# Patient Record
Sex: FEMALE | Race: WHITE | URBAN - NONMETROPOLITAN AREA
[De-identification: names, ages, dates, MRNs, and addresses within clinical notes are randomized per-mention and may not be internally consistent; named-entity substitution may affect disease eponyms.]

---

## 2017-01-03 ENCOUNTER — NURSE ONLY (OUTPATIENT)
Dept: FAMILY MEDICINE CLINIC | Facility: CLINIC | Age: 57
End: 2017-01-03

## 2017-01-03 PROCEDURE — 90715 TDAP VACCINE 7 YRS/> IM: CPT | Performed by: FAMILY MEDICINE

## 2017-01-03 PROCEDURE — 90471 IMMUNIZATION ADMIN: CPT | Performed by: FAMILY MEDICINE

## 2017-03-06 ENCOUNTER — OFFICE VISIT (OUTPATIENT)
Dept: FAMILY MEDICINE CLINIC | Facility: CLINIC | Age: 57
End: 2017-03-06

## 2017-03-06 VITALS
TEMPERATURE: 98 F | DIASTOLIC BLOOD PRESSURE: 96 MMHG | HEART RATE: 76 BPM | WEIGHT: 149 LBS | SYSTOLIC BLOOD PRESSURE: 156 MMHG | OXYGEN SATURATION: 94 %

## 2017-03-06 DIAGNOSIS — J01.40 ACUTE NON-RECURRENT PANSINUSITIS: Primary | ICD-10-CM

## 2017-03-06 DIAGNOSIS — R03.0 ELEVATED BLOOD PRESSURE READING: ICD-10-CM

## 2017-03-06 PROCEDURE — 99213 OFFICE O/P EST LOW 20 MIN: CPT | Performed by: FAMILY MEDICINE

## 2017-03-06 RX ORDER — AMOXICILLIN AND CLAVULANATE POTASSIUM 875; 125 MG/1; MG/1
1 TABLET, FILM COATED ORAL 2 TIMES DAILY
Qty: 20 TABLET | Refills: 0 | Status: SHIPPED | OUTPATIENT
Start: 2017-03-06 | End: 2017-03-16

## 2017-03-06 NOTE — PROGRESS NOTES
HPI:   Leodan Iyer is a 64year old female who presents for upper respiratory symptoms for  3  weeks. Patient reports sore throat only at the beginning of sx's, green colored nasal discharge, cough with green colored sputum, sinus pain, denies fever. PLAN:   Cassi Cortez is a 64year old female who presents with Acute non-recurrent pansinusitis  (primary encounter diagnosis)  Elevated blood pressure reading  No orders of the defined types were placed in this encounter.      Meds & Refills for this Vi

## 2017-03-06 NOTE — PATIENT INSTRUCTIONS
Push fluids, add nasal saline 3-4 times daily, continue Flonase 2 sprays per nostril daily  Augmentin 875 mg twice daily ×10 days with food, side effects discussed  Discussed conservative management of hypertension including sodium restriction, daily aerob

## 2018-01-23 ENCOUNTER — TELEPHONE (OUTPATIENT)
Dept: FAMILY MEDICINE CLINIC | Facility: CLINIC | Age: 58
End: 2018-01-23

## 2018-01-23 NOTE — TELEPHONE ENCOUNTER
Pt c/o ear pain since last night. Offered appt @ 3pm today-----pt unable to make it.  Will go to [facility] for eval after she gets off work

## 2018-04-21 ENCOUNTER — TELEPHONE (OUTPATIENT)
Dept: FAMILY MEDICINE CLINIC | Facility: CLINIC | Age: 58
End: 2018-04-21

## 2018-12-06 ENCOUNTER — OFFICE VISIT (OUTPATIENT)
Dept: FAMILY MEDICINE CLINIC | Facility: CLINIC | Age: 58
End: 2018-12-06
Payer: COMMERCIAL

## 2018-12-06 VITALS
BODY MASS INDEX: 25.58 KG/M2 | HEIGHT: 63 IN | OXYGEN SATURATION: 98 % | HEART RATE: 68 BPM | TEMPERATURE: 99 F | DIASTOLIC BLOOD PRESSURE: 98 MMHG | WEIGHT: 144.38 LBS | SYSTOLIC BLOOD PRESSURE: 150 MMHG

## 2018-12-06 DIAGNOSIS — J04.10 TRACHEITIS: Primary | ICD-10-CM

## 2018-12-06 DIAGNOSIS — J20.9 BRONCHITIS WITH BRONCHOSPASM: ICD-10-CM

## 2018-12-06 DIAGNOSIS — R03.0 ELEVATED BLOOD PRESSURE READING: ICD-10-CM

## 2018-12-06 PROCEDURE — 99213 OFFICE O/P EST LOW 20 MIN: CPT | Performed by: FAMILY MEDICINE

## 2018-12-06 RX ORDER — BENZONATATE 200 MG/1
200 CAPSULE ORAL NIGHTLY
Qty: 10 CAPSULE | Refills: 0 | Status: SHIPPED | OUTPATIENT
Start: 2018-12-06

## 2018-12-06 RX ORDER — PREDNISONE 20 MG/1
20 TABLET ORAL 2 TIMES DAILY
Qty: 10 TABLET | Refills: 0 | Status: SHIPPED | OUTPATIENT
Start: 2018-12-06 | End: 2018-12-11

## 2018-12-06 RX ORDER — ALBUTEROL SULFATE 90 UG/1
2 AEROSOL, METERED RESPIRATORY (INHALATION) EVERY 4 HOURS PRN
Qty: 1 INHALER | Refills: 0 | Status: SHIPPED | OUTPATIENT
Start: 2018-12-06

## 2018-12-06 NOTE — PATIENT INSTRUCTIONS
I discussed the viral nature of the illness as well as the expected duration, course and progression of symptoms  I discussed the inappropriateness of antibiotics  Recommend albuterol 2 puffs up to every 4 hours as needed for cough, chest tightness, wheezi

## 2018-12-10 ENCOUNTER — TELEPHONE (OUTPATIENT)
Dept: FAMILY MEDICINE CLINIC | Facility: CLINIC | Age: 58
End: 2018-12-10

## 2018-12-10 RX ORDER — AZITHROMYCIN 250 MG/1
TABLET, FILM COATED ORAL
Qty: 1 PACKAGE | Refills: 0 | Status: SHIPPED | OUTPATIENT
Start: 2018-12-10

## 2018-12-10 NOTE — TELEPHONE ENCOUNTER
ELVIS ISN'T GETTING ANY BETTER FROM LAST WEEK WHEN SHE WAS IN. SHE WAS WONDERING IF SHE COULD GET AN ANTIBIOTIC TO HELP THINGS ALONG?   PLEASE CALL HER

## 2018-12-10 NOTE — TELEPHONE ENCOUNTER
Pt calls back. States still no fever and her chest congestion is finally \"breaking up\". Coughing a lot, productive ~50% of the time---thick and \"milky\" in color. Chest still feels heavy and has wheezing.  Using albuterol, taking prednisone and Tessalo

## 2018-12-10 NOTE — TELEPHONE ENCOUNTER
L/m to c/b    (pt seen on 12/6 for tracheitis and bronchitis w/ bronchospasm.  Was prescribed albuterol, prednisone and Tessalon)

## 2018-12-10 NOTE — TELEPHONE ENCOUNTER
I would continue another 3 days of prednisone 20 mg twice daily, continue all other medications  Start the Z-Phill

## 2019-03-15 ENCOUNTER — PATIENT OUTREACH (OUTPATIENT)
Dept: FAMILY MEDICINE CLINIC | Facility: CLINIC | Age: 59
End: 2019-03-15

## 2019-06-28 ENCOUNTER — OFFICE VISIT (OUTPATIENT)
Dept: OBGYN | Age: 59
End: 2019-06-28

## 2019-06-28 VITALS
BODY MASS INDEX: 23.05 KG/M2 | HEIGHT: 64 IN | SYSTOLIC BLOOD PRESSURE: 154 MMHG | DIASTOLIC BLOOD PRESSURE: 98 MMHG | WEIGHT: 135 LBS

## 2019-06-28 DIAGNOSIS — Z11.51 SCREENING FOR HUMAN PAPILLOMAVIRUS (HPV): ICD-10-CM

## 2019-06-28 DIAGNOSIS — I10 ESSENTIAL HYPERTENSION: ICD-10-CM

## 2019-06-28 DIAGNOSIS — Z01.419 ENCOUNTER FOR ROUTINE GYNECOLOGICAL EXAMINATION WITH PAPANICOLAOU SMEAR OF CERVIX: Primary | ICD-10-CM

## 2019-06-28 DIAGNOSIS — Z12.31 VISIT FOR SCREENING MAMMOGRAM: ICD-10-CM

## 2019-06-28 DIAGNOSIS — Z01.411 ENCOUNTER FOR GYNECOLOGICAL EXAMINATION WITH ABNORMAL FINDING: ICD-10-CM

## 2019-06-28 PROCEDURE — 88142 CYTOPATH C/V THIN LAYER: CPT | Performed by: PATHOLOGY

## 2019-06-28 PROCEDURE — 3080F DIAST BP >= 90 MM HG: CPT | Performed by: NURSE PRACTITIONER

## 2019-06-28 PROCEDURE — 3077F SYST BP >= 140 MM HG: CPT | Performed by: NURSE PRACTITIONER

## 2019-06-28 PROCEDURE — 99386 PREV VISIT NEW AGE 40-64: CPT | Performed by: NURSE PRACTITIONER

## 2019-06-28 RX ORDER — ATORVASTATIN CALCIUM 10 MG/1
10 TABLET, FILM COATED ORAL DAILY
COMMUNITY
Start: 2019-06-24

## 2019-07-03 LAB — AP REPORT: NORMAL

## 2019-07-05 ENCOUNTER — IMAGING SERVICES (OUTPATIENT)
Dept: MAMMOGRAPHY | Age: 59
End: 2019-07-05
Attending: NURSE PRACTITIONER

## 2019-07-05 DIAGNOSIS — Z12.31 VISIT FOR SCREENING MAMMOGRAM: ICD-10-CM

## 2019-07-05 PROCEDURE — 77067 SCR MAMMO BI INCL CAD: CPT | Performed by: RADIOLOGY

## 2019-07-05 PROCEDURE — 77063 BREAST TOMOSYNTHESIS BI: CPT | Performed by: RADIOLOGY

## 2019-07-09 LAB — HPV I/H RISK 4 DNA CVX QL NAA+PROBE: NORMAL

## 2019-08-01 ENCOUNTER — PATIENT OUTREACH (OUTPATIENT)
Dept: FAMILY MEDICINE CLINIC | Facility: CLINIC | Age: 59
End: 2019-08-01

## 2019-09-11 ENCOUNTER — PATIENT OUTREACH (OUTPATIENT)
Dept: FAMILY MEDICINE CLINIC | Facility: CLINIC | Age: 59
End: 2019-09-11

## 2019-10-07 ENCOUNTER — PATIENT OUTREACH (OUTPATIENT)
Dept: FAMILY MEDICINE CLINIC | Facility: CLINIC | Age: 59
End: 2019-10-07

## 2022-04-11 ENCOUNTER — OFFICE VISIT (OUTPATIENT)
Dept: FAMILY MEDICINE | Age: 62
End: 2022-04-11

## 2022-04-11 VITALS
HEIGHT: 63 IN | WEIGHT: 145 LBS | OXYGEN SATURATION: 97 % | DIASTOLIC BLOOD PRESSURE: 80 MMHG | SYSTOLIC BLOOD PRESSURE: 126 MMHG | BODY MASS INDEX: 25.69 KG/M2 | HEART RATE: 64 BPM

## 2022-04-11 DIAGNOSIS — Z12.31 VISIT FOR SCREENING MAMMOGRAM: ICD-10-CM

## 2022-04-11 DIAGNOSIS — Z01.419 ENCOUNTER FOR GYNECOLOGICAL EXAMINATION WITHOUT ABNORMAL FINDING: Primary | ICD-10-CM

## 2022-04-11 DIAGNOSIS — Z12.4 PAP SMEAR FOR CERVICAL CANCER SCREENING: ICD-10-CM

## 2022-04-11 DIAGNOSIS — Z11.51 SCREENING FOR HPV (HUMAN PAPILLOMAVIRUS): ICD-10-CM

## 2022-04-11 PROCEDURE — 87624 HPV HI-RISK TYP POOLED RSLT: CPT | Performed by: PHYSICIAN ASSISTANT

## 2022-04-11 PROCEDURE — PSEU9939 HPV, HIGH RISK: Performed by: CLINICAL MEDICAL LABORATORY

## 2022-04-11 PROCEDURE — 88142 CYTOPATH C/V THIN LAYER: CPT | Performed by: PATHOLOGY

## 2022-04-11 PROCEDURE — G0101 CA SCREEN;PELVIC/BREAST EXAM: HCPCS | Performed by: PHYSICIAN ASSISTANT

## 2022-04-11 PROCEDURE — 87624 HPV HI-RISK TYP POOLED RSLT: CPT | Performed by: CLINICAL MEDICAL LABORATORY

## 2022-04-11 ASSESSMENT — PATIENT HEALTH QUESTIONNAIRE - PHQ9
2. FEELING DOWN, DEPRESSED OR HOPELESS: NOT AT ALL
SUM OF ALL RESPONSES TO PHQ9 QUESTIONS 1 AND 2: 0
CLINICAL INTERPRETATION OF PHQ2 SCORE: NO FURTHER SCREENING NEEDED
SUM OF ALL RESPONSES TO PHQ9 QUESTIONS 1 AND 2: 0
1. LITTLE INTEREST OR PLEASURE IN DOING THINGS: NOT AT ALL

## 2022-04-15 ENCOUNTER — LAB REQUISITION (OUTPATIENT)
Dept: LAB | Age: 62
End: 2022-04-15

## 2022-04-15 DIAGNOSIS — Z11.51 ENCOUNTER FOR SCREENING FOR HUMAN PAPILLOMAVIRUS (HPV): ICD-10-CM

## 2022-04-15 DIAGNOSIS — Z12.4 ENCOUNTER FOR SCREENING FOR MALIGNANT NEOPLASM OF CERVIX: ICD-10-CM

## 2022-04-15 LAB — AP REPORT: NORMAL

## 2022-04-19 LAB
HPV16+18+45 E6+E7MRNA CVX NAA+PROBE: NEGATIVE
HPV16+18+45 E6+E7MRNA CVX NAA+PROBE: NEGATIVE
Lab: NORMAL
Lab: NORMAL

## 2023-04-26 ENCOUNTER — OFFICE VISIT (OUTPATIENT)
Dept: FAMILY MEDICINE CLINIC | Facility: CLINIC | Age: 63
End: 2023-04-26
Payer: COMMERCIAL

## 2023-04-26 VITALS
RESPIRATION RATE: 18 BRPM | HEART RATE: 75 BPM | TEMPERATURE: 98 F | DIASTOLIC BLOOD PRESSURE: 74 MMHG | OXYGEN SATURATION: 95 % | SYSTOLIC BLOOD PRESSURE: 126 MMHG | BODY MASS INDEX: 27 KG/M2 | WEIGHT: 152 LBS

## 2023-04-26 DIAGNOSIS — J02.9 SORE THROAT: ICD-10-CM

## 2023-04-26 DIAGNOSIS — J20.8 ACUTE VIRAL BRONCHITIS: Primary | ICD-10-CM

## 2023-04-26 LAB
CONTROL LINE PRESENT WITH A CLEAR BACKGROUND (YES/NO): YES YES/NO
KIT LOT #: NORMAL NUMERIC
POCT LOT NUMBER: NORMAL
RAPID SARS-COV-2 BY PCR: NOT DETECTED
STREP GRP A CUL-SCR: NEGATIVE

## 2023-04-26 PROCEDURE — U0002 COVID-19 LAB TEST NON-CDC: HCPCS | Performed by: PHYSICIAN ASSISTANT

## 2023-04-26 PROCEDURE — 3074F SYST BP LT 130 MM HG: CPT | Performed by: PHYSICIAN ASSISTANT

## 2023-04-26 PROCEDURE — 3078F DIAST BP <80 MM HG: CPT | Performed by: PHYSICIAN ASSISTANT

## 2023-04-26 PROCEDURE — 87880 STREP A ASSAY W/OPTIC: CPT | Performed by: PHYSICIAN ASSISTANT

## 2023-04-26 PROCEDURE — 99203 OFFICE O/P NEW LOW 30 MIN: CPT | Performed by: PHYSICIAN ASSISTANT

## 2023-04-26 RX ORDER — ALBUTEROL SULFATE 90 UG/1
2 AEROSOL, METERED RESPIRATORY (INHALATION) EVERY 4 HOURS PRN
Qty: 1 EACH | Refills: 0 | Status: SHIPPED | OUTPATIENT
Start: 2023-04-26

## 2023-04-26 RX ORDER — BENZONATATE 200 MG/1
200 CAPSULE ORAL 3 TIMES DAILY PRN
Qty: 30 CAPSULE | Refills: 0 | Status: SHIPPED | OUTPATIENT
Start: 2023-04-26

## 2023-04-26 NOTE — PATIENT INSTRUCTIONS
Rapid strep and rapid COVID-19 PCR negative. Tessalon (benzonatate) 200 mg up to three times daily as needed for cough. Albuterol inhaler 2 puffs inhaled every 4 to 6 hours as needed for wheezing or shortness of breath.    Encourage fluids, humidifier/vaporizor at bedside, elevate head of bed (sleep with extra pillow), vapor rub to chest, steam therapy if no fever, warm compresses for sinus pressure if no fever, salt water gargles for sore throat, lozenges for sore throat, may try over the counter saline nasal spray or irrigation kit (use distilled water with irrigation kit) for sinus pressure/congestion, get plenty of rest.

## 2023-04-29 ENCOUNTER — HOSPITAL ENCOUNTER (OUTPATIENT)
Age: 63
Discharge: HOME | End: 2023-04-29
Payer: COMMERCIAL

## 2023-04-29 ENCOUNTER — APPOINTMENT (OUTPATIENT)
Dept: GENERAL RADIOLOGY | Age: 63
End: 2023-04-29
Attending: NURSE PRACTITIONER
Payer: COMMERCIAL

## 2023-04-29 ENCOUNTER — OFFICE VISIT (OUTPATIENT)
Dept: FAMILY MEDICINE CLINIC | Facility: CLINIC | Age: 63
End: 2023-04-29
Payer: COMMERCIAL

## 2023-04-29 VITALS
HEART RATE: 86 BPM | TEMPERATURE: 98 F | DIASTOLIC BLOOD PRESSURE: 86 MMHG | OXYGEN SATURATION: 98 % | RESPIRATION RATE: 20 BRPM | SYSTOLIC BLOOD PRESSURE: 150 MMHG

## 2023-04-29 VITALS
OXYGEN SATURATION: 95 % | HEART RATE: 98 BPM | BODY MASS INDEX: 26 KG/M2 | SYSTOLIC BLOOD PRESSURE: 130 MMHG | WEIGHT: 149 LBS | DIASTOLIC BLOOD PRESSURE: 82 MMHG | TEMPERATURE: 97 F | RESPIRATION RATE: 20 BRPM

## 2023-04-29 DIAGNOSIS — R05.1 ACUTE COUGH: Primary | ICD-10-CM

## 2023-04-29 DIAGNOSIS — J20.8 VIRAL BRONCHITIS: ICD-10-CM

## 2023-04-29 DIAGNOSIS — R06.02 SHORTNESS OF BREATH: ICD-10-CM

## 2023-04-29 DIAGNOSIS — R06.2 WHEEZING: ICD-10-CM

## 2023-04-29 LAB — SARS-COV-2 RNA RESP QL NAA+PROBE: NOT DETECTED

## 2023-04-29 PROCEDURE — U0002 COVID-19 LAB TEST NON-CDC: HCPCS | Performed by: NURSE PRACTITIONER

## 2023-04-29 PROCEDURE — 99213 OFFICE O/P EST LOW 20 MIN: CPT | Performed by: NURSE PRACTITIONER

## 2023-04-29 PROCEDURE — 71046 X-RAY EXAM CHEST 2 VIEWS: CPT | Performed by: NURSE PRACTITIONER

## 2023-04-29 PROCEDURE — 94640 AIRWAY INHALATION TREATMENT: CPT | Performed by: NURSE PRACTITIONER

## 2023-04-29 PROCEDURE — 3079F DIAST BP 80-89 MM HG: CPT | Performed by: NURSE PRACTITIONER

## 2023-04-29 PROCEDURE — 3075F SYST BP GE 130 - 139MM HG: CPT | Performed by: NURSE PRACTITIONER

## 2023-04-29 RX ORDER — CODEINE PHOSPHATE AND GUAIFENESIN 10; 100 MG/5ML; MG/5ML
10 SOLUTION ORAL EVERY 6 HOURS PRN
Qty: 180 ML | Refills: 0 | Status: SHIPPED | OUTPATIENT
Start: 2023-04-29

## 2023-04-29 RX ORDER — PREDNISONE 20 MG/1
20 TABLET ORAL 2 TIMES DAILY
Qty: 10 TABLET | Refills: 0 | COMMUNITY
Start: 2023-04-27 | End: 2023-05-02

## 2023-04-29 RX ORDER — IPRATROPIUM BROMIDE AND ALBUTEROL SULFATE 2.5; .5 MG/3ML; MG/3ML
3 SOLUTION RESPIRATORY (INHALATION) ONCE
Status: COMPLETED | OUTPATIENT
Start: 2023-04-29 | End: 2023-04-29

## 2023-04-29 NOTE — PROGRESS NOTES
Fanny Petty is a 58year old female who presents to [facility] with c/o worsening cough, wheezing this week. Seen her on 4/26 and had negative strep and covid testing. Placed on albuterol and benzonatate. Did virtual visit with pcp on 4/27 and started on prednisone. Notes worsening cough, with wheezing and low grade temps to 100. On exam pt has coarse lungs bilat. Faint expiratory wheeze. Nonproductive cough noted. O2 sats 95-96%    Accompanied by: self  After triage, higher acuity of care was recommended to Fanny Petty today. I called the [facility] and spoke to the provider. She ok'd transfer. Discussed HPI and physical exam with pt. We discussed the limited diagnostic capacity of this clinic and there are dangerous conditions associated with her worsening cough, wheezing and low grade fevers that I can not fully rule out. I advised she be seen in the IC. Pt verbalized understanding and notes she will go to the [facility]. She declined medical transport. We discussed the risks of not going by medical transport including worsening condition, permanent injury and/or death. She verbalized understanding an signed refusal.    Site recommendation: :Sandeephelen Kizzy verbalized understanding of rationale for further evaluation and was stable upon discharge.

## 2023-04-29 NOTE — ED INITIAL ASSESSMENT (HPI)
Patient has been coughing since Monday and is getting worse. She had a negative covid on Wednesday. She went to the [facility] today and was sent here for an xray.  She has expiratory wheezing to all lobes currently

## 2024-02-08 ENCOUNTER — APPOINTMENT (OUTPATIENT)
Dept: PULMONOLOGY | Age: 64
End: 2024-02-08

## 2024-02-29 ENCOUNTER — APPOINTMENT (OUTPATIENT)
Dept: PULMONOLOGY | Age: 64
End: 2024-02-29

## 2024-05-29 ENCOUNTER — APPOINTMENT (OUTPATIENT)
Dept: PULMONOLOGY | Age: 64
End: 2024-05-29

## 2024-08-25 NOTE — PROGRESS NOTES
Please call patient to schedule physical/Medicare AWV.  Overdue for colon cancer screening (and, likely, other services as well). [phone] (home) Left message for patient to call back.
No

## 2024-12-17 ENCOUNTER — OFFICE VISIT (OUTPATIENT)
Dept: FAMILY MEDICINE CLINIC | Facility: CLINIC | Age: 64
End: 2024-12-17
Payer: COMMERCIAL

## 2024-12-17 VITALS
SYSTOLIC BLOOD PRESSURE: 135 MMHG | DIASTOLIC BLOOD PRESSURE: 79 MMHG | TEMPERATURE: 98 F | RESPIRATION RATE: 18 BRPM | OXYGEN SATURATION: 98 % | HEART RATE: 78 BPM

## 2024-12-17 DIAGNOSIS — J02.9 SORE THROAT: Primary | ICD-10-CM

## 2024-12-17 DIAGNOSIS — J06.9 VIRAL URI: ICD-10-CM

## 2024-12-17 LAB
CONTROL LINE PRESENT WITH A CLEAR BACKGROUND (YES/NO): YES YES/NO
KIT LOT #: NORMAL NUMERIC
STREP GRP A CUL-SCR: NEGATIVE

## 2024-12-17 PROCEDURE — 3075F SYST BP GE 130 - 139MM HG: CPT | Performed by: NURSE PRACTITIONER

## 2024-12-17 PROCEDURE — 87880 STREP A ASSAY W/OPTIC: CPT | Performed by: NURSE PRACTITIONER

## 2024-12-17 PROCEDURE — 3078F DIAST BP <80 MM HG: CPT | Performed by: NURSE PRACTITIONER

## 2024-12-17 PROCEDURE — 87637 SARSCOV2&INF A&B&RSV AMP PRB: CPT | Performed by: NURSE PRACTITIONER

## 2024-12-17 PROCEDURE — 99213 OFFICE O/P EST LOW 20 MIN: CPT | Performed by: NURSE PRACTITIONER

## 2024-12-17 RX ORDER — ATORVASTATIN CALCIUM 10 MG/1
10 TABLET, FILM COATED ORAL DAILY
COMMUNITY
Start: 2024-11-30

## 2024-12-17 NOTE — PROGRESS NOTES
CHIEF COMPLAINT:     Chief Complaint   Patient presents with    Sinus Problem     Fevers, ear pressure, sore throat, sinus congestion       HPI:   Maria D Jaocbs is a 64 year old female who presents for sore throat, sinus pressure, ear pressure, tactile fevers and bodya ches. Patient reports symptoms present x 2-3 days.  Denies any wheezing, chest discomfort, or shortness of breath. .  Treating symptoms with otc cold meds.   Tolerates PO well at home. No n/v/d.  Denies any other aggravating or relieving factors at home. Denies any other treatment attempts prior to arrival.   Denies known close covid-19 or strep exposure.    Current Outpatient Medications   Medication Sig Dispense Refill    atorvastatin 10 MG Oral Tab Take 1 tablet (10 mg total) by mouth daily.      albuterol 108 (90 Base) MCG/ACT Inhalation Aero Soln Inhale 2 puffs into the lungs every 4 (four) hours as needed for Wheezing or Shortness of Breath. 1 each 0      Past Medical History:    Frozen shoulder      Past Surgical History:   Procedure Laterality Date          Other surgical history Right     breast biopsy-benign    Tonsillectomy           Social History     Socioeconomic History    Marital status:    Tobacco Use    Smoking status: Never    Smokeless tobacco: Never   Substance and Sexual Activity    Alcohol use: No    Drug use: No     Social Drivers of Health     Food Insecurity: Low Risk  (10/26/2022)    Received from [facility]    Food Insecurity     Have there been times that your food ran out, and you didn't have money to get more?: No     Are there times that you worry that this might happen?: No   Transportation Needs: Low Risk  (10/26/2022)    Received from [facility]    Transportation Needs     Do you have trouble getting transportation to medical appointments?: No    [facility]    Social Connections    Received  from [facility], [facility]    Housing Stability         REVIEW OF SYSTEMS:   GENERAL: Denies fever today. + tactile fever on Sunday. Notes good appetite  SKIN: no rashes or abnormal skin lesions  HEENT: + sore throat, + sinus symptoms, + bilat ear pressure. Denies ear pain/drainage  LUNGS: + occasional nonproductive cough, denies shortness of breath or wheezing,   CARDIOVASCULAR: denies chest pain or palpitations   GI: denies N/V/C or abdominal pain  NEURO: Denies headaches    EXAM:   /79   Pulse 78   Temp 98.2 °F (36.8 °C) (Temporal)   Resp 18   SpO2 98%   GENERAL: well developed, well nourished,in no apparent distress  SKIN: no rashes,no suspicious lesions  HEAD: atraumatic, normocephalic.    EYES: conjunctiva clear  EARS: TM's intact and without erythema, no bulging, no retraction,no fluid, bony landmarks visualized. No erythema or swelling noted to ear canals or external ears.   NOSE: Nostrils patent, clear nasal discharge, nasal mucosa reddened   THROAT: Oral mucosa pink, moist. Posterior pharynx is erythematous. No exudates. No tonsillar hypertrophy noted.  No trismus. Uvula midline with no swelling. Voice clear/normal. No stridor  NECK: Supple, non-tender  LUNGS: clear to auscultation bilaterally, no rales, wheezes or rhonchi. Breathing is non labored.  CARDIO: RRR without murmur  EXTREMITIES: no cyanosis, clubbing or edema  LYMPH:  No lymphadenopathy.        ASSESSMENT AND PLAN:       ICD-10-CM    1. Sore throat  J02.9 Rapid Strep      2. Viral URI  J06.9 SARS-CoV-2/Flu A and B/RSV by PCR ([facility]) [E]     SARS-CoV-2/Flu A and B/RSV by PCR ([facility]) [E]        Viral panelt testing sent to lab.    Rapid strep negative.     Discussed physical exam and hpi with pt.  Pt has reassuring physical exam consistent with pharyngitis and mild viral uri symptoms.  No signs of pta or retropharyngeal infection.Lungs clear bilat. No respiratory distress noted. We discussed  covid-19 vs strep vs other etiologies. Rapid strep test negative. Viral panel test sent to lab.  Treatment options discussed with patient and explained in detail. We reviewed symptomatic care at home. The risks, benefits and potential side effects of possible medications were reviewed. Alternatives were discussed. Monitoring parameters and expected course outlined. We reviewed self quarantine guidelines. Patient to call PCP or go to emergency department if symptoms fail to respond as outlined, or worsen in any way. The patient agreed with the plan.      Patient Instructions   1. Rest. Drink plenty of fluids.  2. Tylenol/Ibuprofen for pain/fevers.  3. Salt water gargles three times daily  4. Use humidifier at home when possible.  5. The rapid strep test was negative today.   6. Covid-19/FLU/RSV testing sent to lab.  Self quarantine at this time. If covid-19 test is positive then please follow the listed guidelines below:  Home isolation until:  Your symptoms are improving AND  You are fever free for 24 hours without using fever reducing medications  Resume normal activities, and use added prevention strategies over the next five days.  Clean your hands often  wear a well-fitting mask when around others  keep a distance from others    7. Follow up with PMD in 4-5 days for re-eval. Go to the emergency department immediately if symptoms worsen, change, you develop chest discomfort, wheezing, shortness of breath, or if you have any concerns.

## 2024-12-17 NOTE — PATIENT INSTRUCTIONS
1. Rest. Drink plenty of fluids.  2. Tylenol/Ibuprofen for pain/fevers.  3. Salt water gargles three times daily  4. Use humidifier at home when possible.  5. The rapid strep test was negative today.   6. Covid-19/FLU/RSV testing sent to lab.  Self quarantine at this time. If covid-19 test is positive then please follow the listed guidelines below:  Home isolation until:  Your symptoms are improving AND  You are fever free for 24 hours without using fever reducing medications  Resume normal activities, and use added prevention strategies over the next five days.  Clean your hands often  wear a well-fitting mask when around others  keep a distance from others    7. Follow up with PMD in 4-5 days for re-eval. Go to the emergency department immediately if symptoms worsen, change, you develop chest discomfort, wheezing, shortness of breath, or if you have any concerns.

## 2024-12-18 LAB
FLUAV + FLUBV RNA SPEC NAA+PROBE: NOT DETECTED
FLUAV + FLUBV RNA SPEC NAA+PROBE: NOT DETECTED
RSV RNA SPEC NAA+PROBE: NOT DETECTED
SARS-COV-2 RNA RESP QL NAA+PROBE: DETECTED

## 2025-01-01 ASSESSMENT — ENCOUNTER SYMPTOMS
POLYPHAGIA: 0
SPEECH DIFFICULTY: 0
ACTIVITY CHANGE: 0
UNEXPECTED WEIGHT CHANGE: 0
NAUSEA: 0
EYE REDNESS: 0
FATIGUE: 0
VOICE CHANGE: 0
TROUBLE SWALLOWING: 0
ABDOMINAL PAIN: 0
RHINORRHEA: 0
HEADACHES: 0
FEVER: 0
EYE PAIN: 0
GASTROINTESTINAL NEGATIVE: 1
VOMITING: 0
CONFUSION: 0
BACK PAIN: 0
SLEEP DISTURBANCE: 0
DIARRHEA: 0
COUGH: 0
POLYDIPSIA: 0
DIZZINESS: 0
NEUROLOGICAL NEGATIVE: 1
CONSTITUTIONAL NEGATIVE: 1
LIGHT-HEADEDNESS: 0
CONSTIPATION: 0
CHEST TIGHTNESS: 0
APPETITE CHANGE: 0
RESPIRATORY NEGATIVE: 1
PHOTOPHOBIA: 0
NERVOUS/ANXIOUS: 0
SHORTNESS OF BREATH: 0
PSYCHIATRIC NEGATIVE: 1
WEAKNESS: 0

## 2025-01-02 ENCOUNTER — APPOINTMENT (OUTPATIENT)
Dept: FAMILY MEDICINE | Age: 65
End: 2025-01-02

## 2025-02-10 ENCOUNTER — APPOINTMENT (OUTPATIENT)
Dept: OTOLARYNGOLOGY | Age: 65
End: 2025-02-10

## 2025-02-10 VITALS — BODY MASS INDEX: 24.24 KG/M2 | HEIGHT: 64 IN | WEIGHT: 142 LBS

## 2025-02-10 DIAGNOSIS — R09.A2 GLOBUS SENSATION: ICD-10-CM

## 2025-02-10 DIAGNOSIS — K21.9 LARYNGOPHARYNGEAL REFLUX (LPR): ICD-10-CM

## 2025-02-10 DIAGNOSIS — R09.89 THROAT CLEARING: Primary | ICD-10-CM

## 2025-02-10 DIAGNOSIS — J31.0 RHINITIS, UNSPECIFIED TYPE: ICD-10-CM

## 2025-02-10 DIAGNOSIS — R09.82 PND (POST-NASAL DRIP): ICD-10-CM

## 2025-02-10 PROCEDURE — 99204 OFFICE O/P NEW MOD 45 MIN: CPT | Performed by: OTOLARYNGOLOGY

## 2025-02-10 PROCEDURE — 31575 DIAGNOSTIC LARYNGOSCOPY: CPT | Performed by: OTOLARYNGOLOGY

## 2025-02-10 RX ORDER — OMEPRAZOLE 40 MG/1
40 CAPSULE, DELAYED RELEASE ORAL
Qty: 90 CAPSULE | OUTPATIENT
Start: 2025-02-10

## 2025-02-10 RX ORDER — AZELASTINE 1 MG/ML
2 SPRAY, METERED NASAL 2 TIMES DAILY
Qty: 30 ML | Refills: 1 | Status: SHIPPED | OUTPATIENT
Start: 2025-02-10

## 2025-02-10 RX ORDER — OMEPRAZOLE 40 MG/1
40 CAPSULE, DELAYED RELEASE ORAL
Qty: 30 CAPSULE | Refills: 1 | Status: SHIPPED | OUTPATIENT
Start: 2025-02-10

## 2025-02-10 RX ORDER — BUDESONIDE AND FORMOTEROL FUMARATE DIHYDRATE 160; 4.5 UG/1; UG/1
2 AEROSOL RESPIRATORY (INHALATION) PRN
COMMUNITY
Start: 2025-02-06

## 2025-04-25 RX ORDER — OMEPRAZOLE 40 MG/1
40 CAPSULE, DELAYED RELEASE ORAL
Qty: 90 CAPSULE | Refills: 0 | Status: SHIPPED | OUTPATIENT
Start: 2025-04-25

## 2025-05-12 ENCOUNTER — APPOINTMENT (OUTPATIENT)
Dept: OTOLARYNGOLOGY | Age: 65
End: 2025-05-12

## 2025-06-01 ASSESSMENT — ENCOUNTER SYMPTOMS
VOMITING: 0
ABDOMINAL PAIN: 0
SHORTNESS OF BREATH: 0
HEADACHES: 0
COUGH: 0
NEUROLOGICAL NEGATIVE: 1
CONSTITUTIONAL NEGATIVE: 1
APPETITE CHANGE: 0
GASTROINTESTINAL NEGATIVE: 1
NAUSEA: 0
RESPIRATORY NEGATIVE: 1
FATIGUE: 0
DIZZINESS: 0
PSYCHIATRIC NEGATIVE: 1
CONFUSION: 0
UNEXPECTED WEIGHT CHANGE: 0
FEVER: 0

## 2025-06-02 ENCOUNTER — APPOINTMENT (OUTPATIENT)
Dept: FAMILY MEDICINE | Age: 65
End: 2025-06-02

## 2025-06-02 DIAGNOSIS — Z11.51 SCREENING FOR HPV (HUMAN PAPILLOMAVIRUS): ICD-10-CM

## 2025-06-02 DIAGNOSIS — Z12.4 PAP SMEAR FOR CERVICAL CANCER SCREENING: Primary | ICD-10-CM

## 2025-06-02 PROCEDURE — G0101 CA SCREEN;PELVIC/BREAST EXAM: HCPCS | Performed by: PHYSICIAN ASSISTANT

## 2025-06-02 PROCEDURE — 87624 HPV HI-RISK TYP POOLED RSLT: CPT | Performed by: INTERNAL MEDICINE

## 2025-06-02 PROCEDURE — 88142 CYTOPATH C/V THIN LAYER: CPT | Performed by: INTERNAL MEDICINE

## 2025-06-04 ENCOUNTER — RESULTS FOLLOW-UP (OUTPATIENT)
Dept: FAMILY MEDICINE | Age: 65
End: 2025-06-04

## 2025-06-04 LAB
CASE RPRT: NORMAL
CYTOLOGY CVX/VAG STUDY: NORMAL
CYTOLOGY CVX/VAG STUDY: NORMAL
HPV16+18+45 E6+E7MRNA CVX NAA+PROBE: NEGATIVE
PAP EDUCATIONAL NOTE: NORMAL
SERVICE CMNT-IMP: NORMAL
STAT OF ADQ CVX/VAG CYTO-IMP: NORMAL

## (undated) NOTE — MR AVS SNAPSHOT
[facility]  [address] [address]  [phone]               Thank you for choosing us for your health care visit with Kera Horta. Deni Lam DO.   We are glad to serve you and happy to provide you with this sum Where to Get Your Medications      These medications were sent to [facility] OF [address] [place]  [address] [address], [address] [facility]     Phone:  [phone]    - Amoxicillin-Pot Clavulanate 87125 MG Ta Women and lighter weight persons: limit to <= 1 drink* per day.                            Visit [facility] online at  [email or web address]

## (undated) NOTE — LETTER
Date & Time: 4/29/2023, 11:55 AM  Patient: Sung Hickman  Encounter Provider(s):    TIANA Montejo       To Whom It May Concern:    Sung Hickman was seen and treated in our department on 4/29/2023. She returned to work once symptoms have improved and remains fever free for 24 hours.     If you have any questions or concerns, please do not hesitate to call.        _____________________________  Physician/APC Signature

## (undated) NOTE — LETTER
Date: 4/26/2023    Patient Name: Humberto Meléndez          To Whom it may concern: This letter has been written at the patient's request. The above patient was seen at the [facility] for treatment of a medical condition. This patient should be excused from attending work/school from 4/25/23 through 4/26/23.       Sincerely,    Susie Green PA-C

## (undated) NOTE — LETTER
Date: 12/17/2024    Patient Name: Maria D Jacobs          To Whom it may concern:     The above patient was seen at [facility] for treatment of a medical condition. Please excuse her absences this week. She can return to work once she is feeling better and is fever free for 24hrs without the use of fever-reducing medications.          Sincerely,    Huber Martínez NP